# Patient Record
Sex: MALE | Race: WHITE | NOT HISPANIC OR LATINO
[De-identification: names, ages, dates, MRNs, and addresses within clinical notes are randomized per-mention and may not be internally consistent; named-entity substitution may affect disease eponyms.]

---

## 2022-05-18 ENCOUNTER — APPOINTMENT (OUTPATIENT)
Dept: ORTHOPEDIC SURGERY | Facility: CLINIC | Age: 72
End: 2022-05-18
Payer: COMMERCIAL

## 2022-05-18 VITALS — WEIGHT: 208 LBS | HEIGHT: 66 IN | BODY MASS INDEX: 33.43 KG/M2

## 2022-05-18 DIAGNOSIS — Z78.9 OTHER SPECIFIED HEALTH STATUS: ICD-10-CM

## 2022-05-18 DIAGNOSIS — I10 ESSENTIAL (PRIMARY) HYPERTENSION: ICD-10-CM

## 2022-05-18 DIAGNOSIS — E78.00 PURE HYPERCHOLESTEROLEMIA, UNSPECIFIED: ICD-10-CM

## 2022-05-18 DIAGNOSIS — M54.50 LOW BACK PAIN, UNSPECIFIED: ICD-10-CM

## 2022-05-18 PROBLEM — Z00.00 ENCOUNTER FOR PREVENTIVE HEALTH EXAMINATION: Status: ACTIVE | Noted: 2022-05-18

## 2022-05-18 PROCEDURE — 99203 OFFICE O/P NEW LOW 30 MIN: CPT

## 2022-05-18 PROCEDURE — 99204 OFFICE O/P NEW MOD 45 MIN: CPT

## 2022-05-19 PROBLEM — M54.50 LUMBAR SPINE PAIN: Status: ACTIVE | Noted: 2022-05-18

## 2022-05-19 PROBLEM — Z78.9 NON-SMOKER: Status: ACTIVE | Noted: 2022-05-18

## 2022-05-19 PROBLEM — Z78.9 SOCIAL ALCOHOL USE: Status: ACTIVE | Noted: 2022-05-18

## 2022-05-19 NOTE — REVIEW OF SYSTEMS
[Joint Pain] : joint pain [Joint Stiffness] : joint stiffness [Negative] : Heme/Lymph [FreeTextEntry9] : lumbar spine

## 2022-05-19 NOTE — DISCUSSION/SUMMARY
[Medication Risks Reviewed] : Medication risks reviewed [Surgical risks reviewed] : Surgical risks reviewed [de-identified] : Reviewed and discussed results of lumbar spine MRI scan. Discussed proper body mechanics and modified physical activity to avoid aggravation of symptoms. Discussed at length underlying pathology of spondylolisthesis, stenosis with associated symptoms of back and buttock pain. Discussed conservative treatment options in the form of NSAIDs, physical therapy, and injection therapy. Discussed at length surgical intervention in the form of laminectomy and fusion(TLIF L4-5). Discussed risks and benefits. Advised patient to exhaust all conservative treatment options before pursuing surgical intervention. Addressed all questions and concerns to patient's satisfaction. Explained medical clearance process for surgery. Discussed further conservative treatment in the form of medial branch block and radio frequency ablation. Patient will call if he wants to move forward with surgical intervention.

## 2022-05-19 NOTE — HISTORY OF PRESENT ILLNESS
[Gradual] : gradual [5] : 5 [Localized] : localized [Constant] : constant [Household chores] : household chores [Leisure] : leisure [Work] : work [Sleep] : sleep [Nothing helps with pain getting better] : Nothing helps with pain getting better [Sitting] : sitting [Standing] : standing [Walking] : walking [Bending forward] : bending forward [Extending back] : extending back [Lying in bed] : lying in bed [Full time] : Work status: full time [de-identified] : Patient presents for initial encounter with RT sided lower back pain. Patient states he has history of lower back pain that has recently gotten worse. Patient has had history of hip surgeries. Patient states his pain episodically radiates into lower extremity. No numbness/tingling sensation. No changes in lower extremity strength b/l. Patient has been progressively symptomatic approaching  year duration and found no relief with physical therapy and  has continued with at home exercises/stretches as best tolerated. Treatment with epidural injections provided transient relief. Treatment with OTC NSAIDs have not provided satisfactory relief. Patient is slowly becoming more functionally incapacitated , especially with attempts at extended standing which is required for him at work as a . [] : no [FreeTextEntry4] : few years ago [FreeTextEntry5] : onset, no known injury [FreeTextEntry6] : right side upper buttocks area

## 2022-06-13 ENCOUNTER — RESULT REVIEW (OUTPATIENT)
Age: 72
End: 2022-06-13

## 2022-06-15 ENCOUNTER — APPOINTMENT (OUTPATIENT)
Dept: ORTHOPEDIC SURGERY | Facility: CLINIC | Age: 72
End: 2022-06-15

## 2022-06-15 VITALS — WEIGHT: 208 LBS | HEIGHT: 66 IN | BODY MASS INDEX: 33.43 KG/M2

## 2022-06-15 PROCEDURE — 99213 OFFICE O/P EST LOW 20 MIN: CPT

## 2022-06-15 NOTE — HISTORY OF PRESENT ILLNESS
[de-identified] : /Patient returns to the office for pre-operative visit. He is scheduled for L4-5 PLIF on 6/27/22.\par \par Since the last office visit His symptoms have remained unchanged.  No new complaints.\par Preoperative laboratory testing is pending for tomorrow. Medical and cardiac clearances have not yet been obtained\par \par \par

## 2022-06-15 NOTE — DISCUSSION/SUMMARY
[Surgical risks reviewed] : Surgical risks reviewed [de-identified] : Together in the office we reviewed the current diagnosis and its contributions to His symptoms.  The planned surgical procedure was reviewed and explained to His satisfaction including the risks and benefits.  This risk benefit conversation included, but was not limited to infection, bleeding, neurological injury, CSF leak, need for dural repair, hardware male position, pseudoarthrosis, need for additional operation in the future, risks of general anesthesia. Expected outcomes included reduction in pain, improvement in function and expected recovery timeframe. All questions were answered to their satisfaction.\par \par Patient was reminded to bring their diagnostic imaging to the OR on the date of surgery.\par \par he will need to obtain both medical and cardiac clearances (hx of stent x 3).\par \par \par \par \par

## 2022-06-20 RX ORDER — HYDROCODONE BITARTRATE AND ACETAMINOPHEN 10; 325 MG/1; MG/1
10-325 TABLET ORAL
Qty: 60 | Refills: 0 | Status: ACTIVE | COMMUNITY
Start: 2022-06-20 | End: 1900-01-01

## 2022-06-21 ENCOUNTER — NON-APPOINTMENT (OUTPATIENT)
Age: 72
End: 2022-06-21

## 2022-06-22 ENCOUNTER — APPOINTMENT (OUTPATIENT)
Dept: ORTHOPEDIC SURGERY | Facility: CLINIC | Age: 72
End: 2022-06-22
Payer: COMMERCIAL

## 2022-06-22 VITALS — BODY MASS INDEX: 33.43 KG/M2 | HEIGHT: 66 IN | WEIGHT: 208 LBS

## 2022-06-22 PROCEDURE — 72110 X-RAY EXAM L-2 SPINE 4/>VWS: CPT

## 2022-06-22 PROCEDURE — 99214 OFFICE O/P EST MOD 30 MIN: CPT

## 2022-06-22 PROCEDURE — 99212 OFFICE O/P EST SF 10 MIN: CPT

## 2022-06-27 ENCOUNTER — APPOINTMENT (OUTPATIENT)
Dept: ORTHOPEDIC SURGERY | Facility: HOSPITAL | Age: 72
End: 2022-06-27

## 2022-06-27 PROCEDURE — 22633 ARTHRD CMBN 1NTRSPC LUMBAR: CPT | Mod: AS

## 2022-06-27 PROCEDURE — 22853 INSJ BIOMECHANICAL DEVICE: CPT

## 2022-06-27 PROCEDURE — 22853 INSJ BIOMECHANICAL DEVICE: CPT | Mod: AS

## 2022-06-27 PROCEDURE — 63052 LAM FACETC/FRMT ARTHRD LUM 1: CPT

## 2022-06-27 PROCEDURE — 63052 LAM FACETC/FRMT ARTHRD LUM 1: CPT | Mod: AS

## 2022-06-27 PROCEDURE — 22840 INSERT SPINE FIXATION DEVICE: CPT

## 2022-06-27 PROCEDURE — 20936 SP BONE AGRFT LOCAL ADD-ON: CPT

## 2022-06-27 PROCEDURE — 22633 ARTHRD CMBN 1NTRSPC LUMBAR: CPT

## 2022-06-27 PROCEDURE — 22840 INSERT SPINE FIXATION DEVICE: CPT | Mod: AS

## 2022-06-27 PROCEDURE — 20936 SP BONE AGRFT LOCAL ADD-ON: CPT | Mod: AS

## 2022-06-27 NOTE — DISCUSSION/SUMMARY
[Surgical risks reviewed] : Surgical risks reviewed [de-identified] : Together in the office we reviewed the current diagnosis and its contributions to his symptoms. The planned surgical procedure was reviewed and explained to his satisfaction including the risks and benefits.  This risk benefit conversation included, but was not limited to infection, bleeding, neurological injury, CSF leak, need for dural repair, hardware mal position, pseudoarthrosis, need for additional operation in the future, risks of general anesthesia. Expected outcomes included reduction in pain, improvement in function and expected recovery timeframe. All questions were answered to their satisfaction. Repeat XRS today show spondyolisthesis at L4-5. Patient will need to obtain both medical and cardiac clearances (hx of stent x 3). Will continue narcotic and muscle relaxer judiciously as prescribed.\par \par \par \par \par \par \par \par

## 2022-06-27 NOTE — PHYSICAL EXAM
[Extension] : extension [Normal Coordination] : normal coordination [Normal DTR UE/LE] : normal DTR UE/LE  [Normal Sensation] : normal sensation [Normal Mood and Affect] : normal mood and affect [Orientated] : orientated [Able to Communicate] : able to communicate [Normal Skin] : normal skin [No Rash] : no rash [No Ulcers] : no ulcers [No Lesions] : no lesions [No obvious lymphadenopathy in areas examined] : no obvious lymphadenopathy in areas examined [Well Developed] : well developed [Well Nourished] : well nourished [Peripheral vascular exam is grossly normal] : peripheral vascular exam is grossly normal [No Respiratory Distress] : no respiratory distress [] : non-antalgic

## 2022-06-27 NOTE — HISTORY OF PRESENT ILLNESS
[de-identified] : Patient returns to the office for pre-operative visit. He is scheduled for L4-5 PLIF on 6/27/22.Since the last office visit His symptoms have remained unchanged. He is currently experience start up pain. Was working on and around his boat last week and had exacerbation of more left sided low back pain where previously he had more right sided pain.\par \par \par

## 2022-07-05 ENCOUNTER — FORM ENCOUNTER (OUTPATIENT)
Age: 72
End: 2022-07-05

## 2022-07-13 ENCOUNTER — APPOINTMENT (OUTPATIENT)
Dept: ORTHOPEDIC SURGERY | Facility: CLINIC | Age: 72
End: 2022-07-13

## 2022-07-13 VITALS — HEIGHT: 66 IN | WEIGHT: 208 LBS | BODY MASS INDEX: 33.43 KG/M2

## 2022-07-13 PROCEDURE — 72100 X-RAY EXAM L-S SPINE 2/3 VWS: CPT

## 2022-07-13 PROCEDURE — 99024 POSTOP FOLLOW-UP VISIT: CPT

## 2022-07-17 NOTE — HISTORY OF PRESENT ILLNESS
[de-identified] : Patient returns to the office for first postoperative visit. He continues to use some narcotic pain medication approximately two tablets a day. He reports that his left leg symptoms are improving as compared to one week ago. There is less pain from the lower back radiating down the leg. He is ambulating with crutches.\par \par He reports there has been some urinary urgency and small amount of leakage of urine. He denies any saddle anesthesia or significant back pain. He's also being somewhat constipated and he's taking mag citrate. He denies fever chills.\par \par \par \par \par \par \par \par \par \par

## 2022-07-17 NOTE — PHYSICAL EXAM
[Outside films reviewed] : Outside films reviewed [de-identified] : Constitutional:\par - General Appearance:\par Unremarkable\par Body Habitus\par Well Developed\par Well Nourished\par Body Habitus\par No Deformities\par Well Groomed\par Ability To communicate:\par Normal\par Neurologic:\par Global sensation is intact to upper and lower extremities. See examination of Neck and/or Spine\par for exceptions.\par Orientation to Time, Place and Person is: Normal\par Mood And Affect is Normal\par Skin:\par - Head/Face, Right Upper/Lower Extremity, Left Upper/Lower Extremity: Normal\par See Examination of Neck and/or Spine for exceptions\par Cardiovascular:\par Peripheral Cardiovascular System is Normal\par Palpation of Lymph Nodes:\par Normal Palpation of lymph nodes in: Axilla, Cervical, Inguinal\par Abnormal Palpation of lymph nodes in: None \par \par Slight edema in the bilateral distal lower extremities. No sign of DVT [] : no drainage [FreeTextEntry3] : Incision healing nicely. 50% sutures removed. The balance will remain another week. [FreeTextEntry1] : Maintenance of alignment of the hardware in the lumbar spine without movement as compared to postoperative imaging.

## 2022-07-17 NOTE — DISCUSSION/SUMMARY
[de-identified] : Patient will continue with postoperative restrictions. He can ambulate as tolerated with the crutches. We will renew his medications next week if needed. He will follow up with Urology. I suspect he may have some urethral irritation, possibly from the Wan catheter. \par \par

## 2022-07-20 ENCOUNTER — APPOINTMENT (OUTPATIENT)
Dept: ORTHOPEDIC SURGERY | Facility: CLINIC | Age: 72
End: 2022-07-20

## 2022-07-20 VITALS — HEIGHT: 66 IN | BODY MASS INDEX: 33.43 KG/M2 | WEIGHT: 208 LBS

## 2022-07-20 DIAGNOSIS — M48.061 SPINAL STENOSIS, LUMBAR REGION WITHOUT NEUROGENIC CLAUDICATION: ICD-10-CM

## 2022-07-20 PROCEDURE — 99024 POSTOP FOLLOW-UP VISIT: CPT

## 2022-07-20 PROCEDURE — 72100 X-RAY EXAM L-S SPINE 2/3 VWS: CPT

## 2022-07-25 NOTE — HISTORY OF PRESENT ILLNESS
[de-identified] : Patient returns to the office for second postoperative visit. He continues to use some narcotic pain medication approximately two tablets a day. He reports that his left leg symptoms are worse compared to one week ago. Attributes some of the heightened severity to running out of pain medication.There is still pain from the lower back radiating down the leg. Pain worse with prolonged sitting/ standing. Reports difficulty with walking. He is ambulating with crutches. Slight improvement with micturition. Reports extreme difficulty sleeping due to severity of pain. \par \par \par \par \par \par \par \par \par \par

## 2022-07-25 NOTE — DISCUSSION/SUMMARY
[Medication Risks Reviewed] : Medication risks reviewed [de-identified] : Patient will continue with postoperative restrictions. He can ambulate as tolerated with the crutches. Renewed oxycodone. Discussed medication management and risks associated with analgesic drug use. Discussed and reviewed results of x-ray. Patient was provided with a referral for  physical therapy to work on stretching, strengthening of lower extremities, gait training. Discussed proper wound care and management. Will follow up in 2 weeks. \par \par Prior to appointment and during encounter with patient extensive medical records were reviewed including but not limited to, hospital records, outpatient records, imaging results, and lab data.During this appointment the patient was examined, diagnoses were discussed and explained in a face to face manner. In addition extensive time was spent reviewing aforementioned diagnostic studies. Counseling including abnormal image results, differential diagnoses, treatment options, risk and benefits, lifestyle changes, current condition, and current medications was performed. Patient's comments, questions, and concerns were addressed and patient verbalized understanding. Based on this patient's presentation at our office, which is an orthopedic spine surgeon's office, this patient inherently / intrinsically has a risk, however minute, of developing issues such as Cauda equina syndrome, bowel and bladder changes, or progression of motor or neurological deficits such as paralysis which may be permanent.\par \par SELMA CABRERA Acting as a Scribe for Dr. Berenice WILSON, Selma Cabrera, attest that this documentation has been prepared under the direction and in the presence of Provider Aayush Jarrett MD. \par \par

## 2022-07-25 NOTE — DATA REVIEWED
[Lumbar Spine] : lumbar spine [I independently reviewed and interpreted images and report] : I independently reviewed and interpreted images and report [I reviewed the films/CD and additionally noted] : I reviewed the films/CD and additionally noted [FreeTextEntry1] : In office xrays taken today demonstrate no change in alignment or implant placement

## 2022-07-25 NOTE — PHYSICAL EXAM
[Implants in position] : Implants in position [No loosening of hardware] : No loosening of hardware [Fusion intact] : Fusion intact [de-identified] : Constitutional:\par - General Appearance:\par Unremarkable\par Body Habitus\par Well Developed\par Well Nourished\par Body Habitus\par No Deformities\par Well Groomed\par Ability To communicate:\par Normal\par Neurologic:\par Global sensation is intact to upper and lower extremities. See examination of Neck and/or Spine\par for exceptions.\par Orientation to Time, Place and Person is: Normal\par Mood And Affect is Normal\par Skin:\par - Head/Face, Right Upper/Lower Extremity, Left Upper/Lower Extremity: Normal\par See Examination of Neck and/or Spine for exceptions\par Cardiovascular:\par Peripheral Cardiovascular System is Normal\par Palpation of Lymph Nodes:\par Normal Palpation of lymph nodes in: Axilla, Cervical, Inguinal\par Abnormal Palpation of lymph nodes in: None \par \par Slight edema in the bilateral distal lower extremities. No sign of DVT [] : no sign of infection

## 2022-08-03 ENCOUNTER — APPOINTMENT (OUTPATIENT)
Dept: ORTHOPEDIC SURGERY | Facility: CLINIC | Age: 72
End: 2022-08-03

## 2022-08-03 VITALS — WEIGHT: 208 LBS | HEIGHT: 66 IN | BODY MASS INDEX: 33.43 KG/M2

## 2022-08-03 PROCEDURE — 99024 POSTOP FOLLOW-UP VISIT: CPT

## 2022-08-14 NOTE — HISTORY OF PRESENT ILLNESS
[de-identified] : Patient returns to the office for third postoperative visit. C/o low back, Rt buttock, and b/l calf pain. States PT is helpful, but his pain is still persistent. He continues to use some narcotic pain medication approximately two tablets a day. He reports that his left leg symptoms are better compared to his last visit, but still uses crutches for ambulation. There is still pain from the lower back radiating down the leg. Pain worse with prolonged sitting/ standing. Reports difficulty with walking. Slight improvement with micturition. Reports extreme difficulty sleeping due to severity of pain. \par \par \par \par \par \par \par \par \par \par \par \par  [de-identified] : PT ,

## 2022-08-14 NOTE — PHYSICAL EXAM
[Implants in position] : Implants in position [No loosening of hardware] : No loosening of hardware [Fusion intact] : Fusion intact [de-identified] : Constitutional:\par - General Appearance:\par Unremarkable\par Body Habitus\par Well Developed\par Well Nourished\par Body Habitus\par No Deformities\par Well Groomed\par Ability To communicate:\par Normal\par Neurologic:\par Global sensation is intact to upper and lower extremities. See examination of Neck and/or Spine\par for exceptions.\par Orientation to Time, Place and Person is: Normal\par Mood And Affect is Normal\par Skin:\par - Head/Face, Right Upper/Lower Extremity, Left Upper/Lower Extremity: Normal\par See Examination of Neck and/or Spine for exceptions\par Cardiovascular:\par Peripheral Cardiovascular System is Normal\par Palpation of Lymph Nodes:\par Normal Palpation of lymph nodes in: Axilla, Cervical, Inguinal\par Abnormal Palpation of lymph nodes in: None \par \par Slight edema in the bilateral distal lower extremities. No sign of DVT [] : no sign of infection [de-identified] : RT buttock tenderness.

## 2022-08-14 NOTE — DISCUSSION/SUMMARY
[Medication Risks Reviewed] : Medication risks reviewed [de-identified] : Patient will continue with postoperative restrictions. He can ambulate as tolerated with the crutches and progress to cane  or no assistive device. . Patient will continue with methocarbamol and Lyrica as prescribed. Patient will call for oxycodone renewal. Discussed medication management and risks associated with analgesic drug use. Patient will continue with physical therapy to work on stretching, strengthening of lower extremities, gait training. Concentrate therapy with goal of strengthening the lower extremities with the goal of eliminating use of crutches. Renewal for PT given.  Discussed proper wound care and management. Will follow up in 3 weeks for repeat x-ray. \par \par Prior to appointment and during encounter with patient extensive medical records were reviewed including but not limited to, hospital records, outpatient records, imaging results, and lab data.During this appointment the patient was examined, diagnoses were discussed and explained in a face to face manner. In addition extensive time was spent reviewing aforementioned diagnostic studies. Counseling including abnormal image results, differential diagnoses, treatment options, risk and benefits, lifestyle changes, current condition, and current medications was performed. Patient's comments, questions, and concerns were addressed and patient verbalized understanding. Based on this patient's presentation at our office, which is an orthopedic spine surgeon's office, this patient inherently / intrinsically has a risk, however minute, of developing issues such as Cauda equina syndrome, bowel and bladder changes, or progression of motor or neurological deficits such as paralysis which may be permanent.\par \par SELMA CABRERA Acting as a Scribe for Dr. Berenice WILSON, Selma Cabrera, attest that this documentation has been prepared under the direction and in the presence of Provider Aayush Jarrett MD. \par \par

## 2022-08-24 ENCOUNTER — APPOINTMENT (OUTPATIENT)
Dept: ORTHOPEDIC SURGERY | Facility: CLINIC | Age: 72
End: 2022-08-24

## 2022-08-24 VITALS — BODY MASS INDEX: 33.43 KG/M2 | WEIGHT: 208 LBS | HEIGHT: 66 IN

## 2022-08-24 PROCEDURE — 99024 POSTOP FOLLOW-UP VISIT: CPT

## 2022-08-24 PROCEDURE — 72100 X-RAY EXAM L-S SPINE 2/3 VWS: CPT

## 2022-08-24 NOTE — HISTORY OF PRESENT ILLNESS
[de-identified] : Patient returns to the office for fourth postoperative visit. \par Reports his symptoms have been improving since his last visit. Patient has been attending PT 2 x week. C/o low back, Rt buttock, and b/l calf pain. Leg pain > leg pain. He continues to use some narcotic pain medication approximately two tablets a day. He reports that his left leg symptoms are better compared to his last visit, but still uses crutches for ambulation. There is still pain from the lower back radiating down the leg, but has improved. Pain worse with prolonged sitting/ standing. Reports difficulty with walking. Slight improvement with micturition. Reports extreme difficulty sleeping due to severity of pain. \par \par \par \par \par \par \par \par \par \par \par \par  [de-identified] : PT ,

## 2022-08-24 NOTE — DISCUSSION/SUMMARY
[Medication Risks Reviewed] : Medication risks reviewed [de-identified] : Discussed and reviewed results of lumbar spine x-ray. Patient will continue with postoperative restrictions. He can ambulate as tolerated with the crutches and progress to cane  or no assistive device.  Patient will continue with methocarbamol and Lyrica as prescribed. Patient will call for oxycodone renewal. Discussed medication management and risks associated with analgesic drug use. Patient will continue with physical therapy to work on stretching, strengthening of lower extremities, gait training. Begin introducing HEP. Concentrate therapy with goal of strengthening the lower extremities with the goal of eliminating use of crutches. Renewal for PT given.  Discussed proper wound care and management. Will follow up in 3-4 weeks. \par \par Prior to appointment and during encounter with patient extensive medical records were reviewed including but not limited to, hospital records, outpatient records, imaging results, and lab data.During this appointment the patient was examined, diagnoses were discussed and explained in a face to face manner. In addition extensive time was spent reviewing aforementioned diagnostic studies. Counseling including abnormal image results, differential diagnoses, treatment options, risk and benefits, lifestyle changes, current condition, and current medications was performed. Patient's comments, questions, and concerns were addressed and patient verbalized understanding. Based on this patient's presentation at our office, which is an orthopedic spine surgeon's office, this patient inherently / intrinsically has a risk, however minute, of developing issues such as Cauda equina syndrome, bowel and bladder changes, or progression of motor or neurological deficits such as paralysis which may be permanent.\par \par SELMA CABRERA Acting as a Scribe for Dr. Berenice WILSON, Selma Cabrera, attest that this documentation has been prepared under the direction and in the presence of Provider Aayush Jarrett MD. \par \par

## 2022-08-24 NOTE — PHYSICAL EXAM
[Implants in position] : Implants in position [No loosening of hardware] : No loosening of hardware [Fusion intact] : Fusion intact [Scoliosis] : Scoliosis [de-identified] : Constitutional:\par - General Appearance:\par Unremarkable\par Body Habitus\par Well Developed\par Well Nourished\par Body Habitus\par No Deformities\par Well Groomed\par Ability To communicate:\par Normal\par Neurologic:\par Global sensation is intact to upper and lower extremities. See examination of Neck and/or Spine\par for exceptions.\par Orientation to Time, Place and Person is: Normal\par Mood And Affect is Normal\par Skin:\par - Head/Face, Right Upper/Lower Extremity, Left Upper/Lower Extremity: Normal\par See Examination of Neck and/or Spine for exceptions\par Cardiovascular:\par Peripheral Cardiovascular System is Normal\par Palpation of Lymph Nodes:\par Normal Palpation of lymph nodes in: Axilla, Cervical, Inguinal\par Abnormal Palpation of lymph nodes in: None \par \par Slight edema in the bilateral distal lower extremities. No sign of DVT [] : no sign of infection [de-identified] : LLE weaker than RLE [de-identified] : RT buttock tenderness.

## 2022-08-25 ENCOUNTER — EMERGENCY (EMERGENCY)
Facility: HOSPITAL | Age: 72
LOS: 1 days | Discharge: HOME | End: 2022-08-25
Attending: EMERGENCY MEDICINE | Admitting: EMERGENCY MEDICINE
Payer: COMMERCIAL

## 2022-08-25 VITALS
WEIGHT: 210.1 LBS | TEMPERATURE: 97 F | DIASTOLIC BLOOD PRESSURE: 79 MMHG | SYSTOLIC BLOOD PRESSURE: 149 MMHG | HEIGHT: 66 IN | RESPIRATION RATE: 18 BRPM | HEART RATE: 54 BPM | OXYGEN SATURATION: 99 %

## 2022-08-25 VITALS
OXYGEN SATURATION: 99 % | SYSTOLIC BLOOD PRESSURE: 133 MMHG | TEMPERATURE: 97 F | RESPIRATION RATE: 16 BRPM | HEART RATE: 58 BPM | DIASTOLIC BLOOD PRESSURE: 75 MMHG

## 2022-08-25 DIAGNOSIS — Z87.39 PERSONAL HISTORY OF OTHER DISEASES OF THE MUSCULOSKELETAL SYSTEM AND CONNECTIVE TISSUE: Chronic | ICD-10-CM

## 2022-08-25 DIAGNOSIS — Z98.1 ARTHRODESIS STATUS: Chronic | ICD-10-CM

## 2022-08-25 PROCEDURE — 72100 X-RAY EXAM L-S SPINE 2/3 VWS: CPT | Mod: 26

## 2022-08-25 PROCEDURE — 99283 EMERGENCY DEPT VISIT LOW MDM: CPT | Mod: 25

## 2022-08-25 PROCEDURE — 99284 EMERGENCY DEPT VISIT MOD MDM: CPT

## 2022-08-25 PROCEDURE — 72100 X-RAY EXAM L-S SPINE 2/3 VWS: CPT
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
## 2022-08-25 NOTE — ED PROVIDER NOTE - NS ED ATTENDING STATEMENT MOD
This was a shared visit with the [facility]. I reviewed and verified the documentation and independently performed the documented:
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
## 2022-08-25 NOTE — ED PROVIDER NOTE - PATIENT PORTAL LINK FT
You can access the FollowMyHealth Patient Portal offered by [facility] by registering at the following website: [email or web address]. By joining [employer]’s FollowMyHealth portal, you will also be able to view your health information using other applications (apps) compatible with our system.

## 2022-08-25 NOTE — ED PROVIDER NOTE - CARE PROVIDER_API CALL
Aayush Jarrett)  Orthopaedic Surgery  [address]  [place] [address]  Phone: [phone]  Fax: [phone]  Follow Up Time:
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
## 2022-08-25 NOTE — ED PROVIDER NOTE - MUSCULOSKELETAL, MLM
Neck supple nontender FROM. Back no midline or paraspinal tenderness; lumbar healed surgical site noted. FROM x 4. No foot drop.

## 2022-09-21 ENCOUNTER — APPOINTMENT (OUTPATIENT)
Dept: ORTHOPEDIC SURGERY | Facility: CLINIC | Age: 72
End: 2022-09-21

## 2022-09-21 VITALS — WEIGHT: 208 LBS | HEIGHT: 66 IN | BODY MASS INDEX: 33.43 KG/M2

## 2022-09-21 PROBLEM — I10 ESSENTIAL (PRIMARY) HYPERTENSION: Chronic | Status: ACTIVE | Noted: 2022-08-25

## 2022-09-21 PROBLEM — I25.10 ATHEROSCLEROTIC HEART DISEASE OF NATIVE CORONARY ARTERY WITHOUT ANGINA PECTORIS: Chronic | Status: ACTIVE | Noted: 2022-08-25

## 2022-09-21 PROBLEM — E78.5 HYPERLIPIDEMIA, UNSPECIFIED: Chronic | Status: ACTIVE | Noted: 2022-08-25

## 2022-09-21 PROCEDURE — 99024 POSTOP FOLLOW-UP VISIT: CPT

## 2022-09-22 NOTE — HISTORY OF PRESENT ILLNESS
[de-identified] : 73 y/o male presents for a fifth post operative visit. Left lower extremity weakness - he is slowly building his strength daily. He has been attending PT 2x week - states he is slowly making progress. He is slowly weening off ambulating with crutch, he is using one in office today. States the crutch is due to his leg pain vs the weakness. He is taking oxycodone every 6 hours - he is working on weening off to every 8 hours. Reports extreme pain when trying to ween off. Pain more severe at nighttime. He has not been taking methocarbamol at nighttime. Compared to a month ago - he reports his symptoms are about the same, slight improvement. C/o low back pain, he still has buttock pain. With respect to walking long distances - he feels he is at a plateau. \par \par \par \par \par \par \par \par \par \par \par  [de-identified] : PT ,

## 2022-09-22 NOTE — DISCUSSION/SUMMARY
[Medication Risks Reviewed] : Medication risks reviewed [de-identified] : He can ambulate as tolerated with the crutches and progress to cane  or no assistive device.  Patient will continue with methocarbamol, Lyrica, and oxycodone as prescribed - renewed Prescriptions. Discussed weening off Lyrica for 4 weeks, advised patient to monitor his symptom response. Patient will continue weening off narcotic. Discussed medication management and risks associated with analgesic drug use. Patient will continue with physical therapy to work on stretching, strengthening of lower extremities, gait training. Concentrate therapy with goal of strengthening the lower extremities with the goal of eliminating use of crutches. Renewal for PT given - he will have dialogue with therapist with respect to transitioning to home program. Patient will receive CT scan to eval for crack of the pedicle, r/o possible implant loosening as a source of persistent back pain. - Referral given. Follow up after CT scan.\par \par Prior to appointment and during encounter with patient extensive medical records were reviewed including but not limited to, hospital records, outpatient records, imaging results, and lab data.During this appointment the patient was examined, diagnoses were discussed and explained in a face to face manner. In addition extensive time was spent reviewing aforementioned diagnostic studies. Counseling including abnormal image results, differential diagnoses, treatment options, risk and benefits, lifestyle changes, current condition, and current medications was performed. Patient's comments, questions, and concerns were addressed and patient verbalized understanding. Based on this patient's presentation at our office, which is an orthopedic spine surgeon's office, this patient inherently / intrinsically has a risk, however minute, of developing issues such as Cauda equina syndrome, bowel and bladder changes, or progression of motor or neurological deficits such as paralysis which may be permanent.\par \stan TAPIA Acting as a Scribe for [address], attest that this documentation has been prepared under the direction and in the presence of Provider Aayush Jarrett MD. \par \par

## 2022-09-22 NOTE — PHYSICAL EXAM
[de-identified] : Constitutional:\par - General Appearance:\par Unremarkable\par Body Habitus\par Well Developed\par Well Nourished\par Body Habitus\par No Deformities\par Well Groomed\par Ability To communicate:\par Normal\par Neurologic:\par Global sensation is intact to upper and lower extremities. See examination of Neck and/or Spine\par for exceptions.\par Orientation to Time, Place and Person is: Normal\par Mood And Affect is Normal\par Skin:\par - Head/Face, Right Upper/Lower Extremity, Left Upper/Lower Extremity: Normal\par See Examination of Neck and/or Spine for exceptions\par Cardiovascular:\par Peripheral Cardiovascular System is Normal\par Palpation of Lymph Nodes:\par Normal Palpation of lymph nodes in: Axilla, Cervical, Inguinal\par Abnormal Palpation of lymph nodes in: None \par \par Slight edema in the bilateral distal lower extremities. No sign of DVT [] : no sign of infection [FreeTextEntry9] : Pain in lower extremities with forward flexion.  [de-identified] : LLE weaker than RLE

## 2022-10-17 ENCOUNTER — RESULT REVIEW (OUTPATIENT)
Age: 72
End: 2022-10-17

## 2022-10-21 ENCOUNTER — APPOINTMENT (OUTPATIENT)
Dept: ORTHOPEDIC SURGERY | Facility: CLINIC | Age: 72
End: 2022-10-21
Payer: COMMERCIAL

## 2022-10-21 VITALS — HEIGHT: 66 IN | WEIGHT: 208 LBS | BODY MASS INDEX: 33.43 KG/M2

## 2022-10-21 PROCEDURE — L0642: CPT

## 2022-10-21 PROCEDURE — 99213 OFFICE O/P EST LOW 20 MIN: CPT

## 2022-10-21 PROCEDURE — 99214 OFFICE O/P EST MOD 30 MIN: CPT

## 2022-10-22 NOTE — DISCUSSION/SUMMARY
[Medication Risks Reviewed] : Medication risks reviewed [de-identified] : 71 y/o male is approximately 4 months post op. Patient will continue weening off narcotic. Discussed medication management and risks associated with analgesic drug use. Discussed and reviewed results of CT scan, patient appears to have progress toward fusion but implant loosening is present. We will continue to monitor patients healing and response to conservative treatment before proceeding with surgical revision. Patient will continue to work on stretching, strengthening of lower extremities, gait training. Concentrate goal of strengthening the lower extremities. Advised patient use LSO at heightened times of physical demand - brace given in office. Discussed revision spine surgery if symptoms worsen.Will reevaluate patients symptoms in 2 months. \par \par Prior to appointment and during encounter with patient extensive medical records were reviewed including but not limited to, hospital records, outpatient records, imaging results, and lab data.During this appointment the patient was examined, diagnoses were discussed and explained in a face to face manner. In addition extensive time was spent reviewing aforementioned diagnostic studies. Counseling including abnormal image results, differential diagnoses, treatment options, risk and benefits, lifestyle changes, current condition, and current medications was performed. Patient's comments, questions, and concerns were addressed and patient verbalized understanding. Based on this patient's presentation at our office, which is an orthopedic spine surgeon's office, this patient inherently / intrinsically has a risk, however minute, of developing issues such as Cauda equina syndrome, bowel and bladder changes, or progression of motor or neurological deficits such as paralysis which may be permanent.\par \par SELMA CABRERA Acting as a Scribe for Dr. Berenice WILSON, [place] [employer], attest that this documentation has been prepared under the direction and in the presence of Provider Aayush Jarrett MD. \par \par

## 2022-10-22 NOTE — PHYSICAL EXAM
[Normal Coordination] : normal coordination [Normal DTR UE/LE] : normal DTR UE/LE  [Normal Sensation] : normal sensation [Normal Mood and Affect] : normal mood and affect [Orientated] : orientated [Able to Communicate] : able to communicate [Normal Skin] : normal skin [No Rash] : no rash [No Ulcers] : no ulcers [No Lesions] : no lesions [No obvious lymphadenopathy in areas examined] : no obvious lymphadenopathy in areas examined [Well Developed] : well developed [Well Nourished] : well nourished [Peripheral vascular exam is grossly normal] : peripheral vascular exam is grossly normal [No Respiratory Distress] : no respiratory distress [de-identified] : Constitutional:\par - General Appearance:\par Unremarkable\par Body Habitus\par Well Developed\par Well Nourished\par Body Habitus\par No Deformities\par Well Groomed\par Ability To communicate:\par Normal\par Neurologic:\par Global sensation is intact to upper and lower extremities. See examination of Neck and/or Spine\par for exceptions.\par Orientation to Time, Place and Person is: Normal\par Mood And Affect is Normal\par Skin:\par - Head/Face, Right Upper/Lower Extremity, Left Upper/Lower Extremity: Normal\par See Examination of Neck and/or Spine for exceptions\par Cardiovascular:\par Peripheral Cardiovascular System is Normal\par Palpation of Lymph Nodes:\par Normal Palpation of lymph nodes in: Axilla, Cervical, Inguinal\par Abnormal Palpation of lymph nodes in: None \par \par Slight edema in the bilateral distal lower extremities. No sign of DVT [] : no sign of infection [FreeTextEntry9] : Pain in lower extremities with forward flexion.  [de-identified] : LLE weaker than RLE

## 2022-10-22 NOTE — DATA REVIEWED
[CT Scan] : CT scan [Lumbar Spine] : lumbar spine [Report was reviewed and noted in the chart] : The report was reviewed and noted in the chart [I independently reviewed and interpreted images and report] : I independently reviewed and interpreted images and report [I reviewed the films/CD and additionally noted] : I reviewed the films/CD and additionally noted [FreeTextEntry1] : I stop paperwork reviewed

## 2022-10-22 NOTE — REASON FOR VISIT
[FreeTextEntry2] : Test follow up, Lumbar spine CT Scan done at [facility]

## 2022-10-22 NOTE — HISTORY OF PRESENT ILLNESS
[de-identified] : 10/21/2022 - 73 y/o male presents for a test follow up of lumbar. Symptoms remain the same since his last visit. Severity of his pain ranges. Moderate pain during the day, significant at night. He is using ibuprofen and oxycodone prn at night to manage his pain. Physical activity is tolerable with prescribed medications, but still problematic. He has continued to ween off oxycodone, taking 2-3 per week. \par \par 09/21/2022 -  73 y/o male presents for a fifth post operative visit. Left lower extremity weakness - he is slowly building his strength daily. He has been attending PT 2x week - states he is slowly making progress. He is slowly weening off ambulating with crutch, he is using one in office today. States the crutch is due to his leg pain vs the weakness. He is taking oxycodone every 6 hours - he is working on weening off to every 8 hours. Reports extreme pain when trying to ween off. Pain more severe at nighttime. He has not been taking methocarbamol at nighttime. Compared to a month ago - he reports his symptoms are about the same, slight improvement. C/o low back pain, he still has buttock pain. With respect to walking long distances - he feels he is at a plateau. \par \par \par \par \par \par \par \par \par \par \par  [de-identified] : PT ,

## 2022-12-09 ENCOUNTER — APPOINTMENT (OUTPATIENT)
Dept: ORTHOPEDIC SURGERY | Facility: CLINIC | Age: 72
End: 2022-12-09

## 2022-12-16 ENCOUNTER — APPOINTMENT (OUTPATIENT)
Dept: ORTHOPEDIC SURGERY | Facility: CLINIC | Age: 72
End: 2022-12-16
Payer: COMMERCIAL

## 2022-12-16 VITALS — WEIGHT: 208 LBS | HEIGHT: 66 IN | BODY MASS INDEX: 33.43 KG/M2

## 2022-12-16 DIAGNOSIS — M43.16 SPONDYLOLISTHESIS, LUMBAR REGION: ICD-10-CM

## 2022-12-16 PROCEDURE — 99214 OFFICE O/P EST MOD 30 MIN: CPT

## 2022-12-16 PROCEDURE — 99213 OFFICE O/P EST LOW 20 MIN: CPT

## 2022-12-29 NOTE — DISCUSSION/SUMMARY
[Medication Risks Reviewed] : Medication risks reviewed [de-identified] : 73 y/o male is approximately 6 months post op. Patient will continue weening off narcotic. Discussed medication management and risks associated with analgesic drug use. We will continue to monitor patients healing and response to conservative treatment before proceeding with surgical revision. However, patient will receive CT scan to evaluate interval fusion compared to last study and implant loosening.  Advised patient use LSO at heightened times of physical demand. Discussed revision spine surgery if symptoms worsen/ based upon results of CT Scan. Will follow up with the patient after study is complete for further treatment plan. \par \par Prior to appointment and during encounter with patient extensive medical records were reviewed including but not limited to, hospital records, outpatient records, imaging results, and lab data.During this appointment the patient was examined, diagnoses were discussed and explained in a face to face manner. In addition extensive time was spent reviewing aforementioned diagnostic studies. Counseling including abnormal image results, differential diagnoses, treatment options, risk and benefits, lifestyle changes, current condition, and current medications was performed. Patient's comments, questions, and concerns were addressed and patient verbalized understanding. Based on this patient's presentation at our office, which is an orthopedic spine surgeon's office, this patient inherently / intrinsically has a risk, however minute, of developing issues such as Cauda equina syndrome, bowel and bladder changes, or progression of motor or neurological deficits such as paralysis which may be permanent.\par \par SELMA TAPIA Acting as a Scribe for [address], attest that this documentation has been prepared under the direction and in the presence of Provider Aayush Jarrett MD. \par \par
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
## 2022-12-29 NOTE — PHYSICAL EXAM
[Normal Coordination] : normal coordination [Normal DTR UE/LE] : normal DTR UE/LE  [Normal Sensation] : normal sensation [Normal Mood and Affect] : normal mood and affect [Orientated] : orientated [Able to Communicate] : able to communicate [Normal Skin] : normal skin [No Rash] : no rash [No Ulcers] : no ulcers [No Lesions] : no lesions [No obvious lymphadenopathy in areas examined] : no obvious lymphadenopathy in areas examined [Well Developed] : well developed [Well Nourished] : well nourished [Peripheral vascular exam is grossly normal] : peripheral vascular exam is grossly normal [No Respiratory Distress] : no respiratory distress [de-identified] : Constitutional:\par - General Appearance:\par Unremarkable\par Body Habitus\par Well Developed\par Well Nourished\par Body Habitus\par No Deformities\par Well Groomed\par Ability To communicate:\par Normal\par Neurologic:\par Global sensation is intact to upper and lower extremities. See examination of Neck and/or Spine\par for exceptions.\par Orientation to Time, Place and Person is: Normal\par Mood And Affect is Normal\par Skin:\par - Head/Face, Right Upper/Lower Extremity, Left Upper/Lower Extremity: Normal\par See Examination of Neck and/or Spine for exceptions\par Cardiovascular:\par Peripheral Cardiovascular System is Normal\par Palpation of Lymph Nodes:\par Normal Palpation of lymph nodes in: Axilla, Cervical, Inguinal\par Abnormal Palpation of lymph nodes in: None \par \par Slight edema in the bilateral distal lower extremities. No sign of DVT [] : clonus not sustained at ankle [FreeTextEntry9] : Pain in lower extremities with forward flexion.  [de-identified] : LLE weaker than RLE

## 2023-02-03 ENCOUNTER — APPOINTMENT (OUTPATIENT)
Dept: ORTHOPEDIC SURGERY | Facility: CLINIC | Age: 73
End: 2023-02-03

## 2023-02-12 ENCOUNTER — FORM ENCOUNTER (OUTPATIENT)
Age: 73
End: 2023-02-12

## 2023-05-09 ENCOUNTER — FORM ENCOUNTER (OUTPATIENT)
Age: 73
End: 2023-05-09

## 2023-05-10 ENCOUNTER — FORM ENCOUNTER (OUTPATIENT)
Age: 73
End: 2023-05-10

## 2023-07-20 ENCOUNTER — APPOINTMENT (OUTPATIENT)
Dept: OTOLARYNGOLOGY | Facility: CLINIC | Age: 73
End: 2023-07-20
Payer: COMMERCIAL

## 2023-07-20 ENCOUNTER — NON-APPOINTMENT (OUTPATIENT)
Age: 73
End: 2023-07-20

## 2023-07-20 VITALS
BODY MASS INDEX: 32.95 KG/M2 | SYSTOLIC BLOOD PRESSURE: 143 MMHG | DIASTOLIC BLOOD PRESSURE: 77 MMHG | WEIGHT: 205 LBS | HEART RATE: 48 BPM | HEIGHT: 66 IN

## 2023-07-20 DIAGNOSIS — H93.293 OTHER ABNORMAL AUDITORY PERCEPTIONS, BILATERAL: ICD-10-CM

## 2023-07-20 DIAGNOSIS — H61.23 IMPACTED CERUMEN, BILATERAL: ICD-10-CM

## 2023-07-20 DIAGNOSIS — H90.3 SENSORINEURAL HEARING LOSS, BILATERAL: ICD-10-CM

## 2023-07-20 PROCEDURE — 99203 OFFICE O/P NEW LOW 30 MIN: CPT | Mod: 25

## 2023-07-20 PROCEDURE — 92557 COMPREHENSIVE HEARING TEST: CPT

## 2023-07-20 PROCEDURE — G0268 REMOVAL OF IMPACTED WAX MD: CPT

## 2023-07-20 PROCEDURE — 92567 TYMPANOMETRY: CPT

## 2023-07-20 RX ORDER — PREGABALIN 75 MG/1
75 CAPSULE ORAL TWICE DAILY
Qty: 60 | Refills: 1 | Status: DISCONTINUED | COMMUNITY
Start: 2022-09-21 | End: 2023-07-20

## 2023-07-20 RX ORDER — METOPROLOL TARTRATE 100 MG/1
100 TABLET, FILM COATED ORAL
Refills: 0 | Status: ACTIVE | COMMUNITY

## 2023-07-20 RX ORDER — METHOCARBAMOL 750 MG/1
750 TABLET, FILM COATED ORAL 4 TIMES DAILY
Qty: 120 | Refills: 1 | Status: DISCONTINUED | COMMUNITY
Start: 2022-09-21 | End: 2023-07-20

## 2023-07-20 RX ORDER — OXYCODONE 5 MG/1
5 TABLET ORAL 4 TIMES DAILY
Qty: 120 | Refills: 0 | Status: DISCONTINUED | COMMUNITY
Start: 2022-09-21 | End: 2023-07-20

## 2023-07-20 RX ORDER — PREGABALIN 75 MG/1
75 CAPSULE ORAL TWICE DAILY
Qty: 60 | Refills: 1 | Status: DISCONTINUED | COMMUNITY
Start: 2022-07-06 | End: 2023-07-20

## 2023-07-20 RX ORDER — METHOCARBAMOL 750 MG/1
750 TABLET, FILM COATED ORAL
Qty: 30 | Refills: 0 | Status: DISCONTINUED | COMMUNITY
Start: 2022-06-20 | End: 2023-07-20

## 2023-07-20 RX ORDER — OXYCODONE 5 MG/1
5 TABLET ORAL 4 TIMES DAILY
Qty: 120 | Refills: 0 | Status: DISCONTINUED | COMMUNITY
Start: 2022-07-20 | End: 2023-07-20

## 2023-07-20 RX ORDER — METHOCARBAMOL 750 MG/1
750 TABLET, FILM COATED ORAL 3 TIMES DAILY
Qty: 90 | Refills: 1 | Status: DISCONTINUED | COMMUNITY
Start: 2022-07-06 | End: 2023-07-20

## 2023-07-20 RX ORDER — OXYCODONE 5 MG/1
5 TABLET ORAL EVERY 6 HOURS
Qty: 60 | Refills: 0 | Status: DISCONTINUED | COMMUNITY
Start: 2022-12-16 | End: 2023-07-20

## 2023-07-20 NOTE — REVIEW OF SYSTEMS
[Hearing Loss] : hearing loss [Problem Snoring] : problem snoring [Negative] : Heme/Lymph [FreeTextEntry1] : daytime sleepiness

## 2023-07-20 NOTE — DATA REVIEWED
[de-identified] : Type As AD\par Type A AS\par AUDIO: normal sloping to severe SNHL, 250-8000Hz, AU\par REC: ENT f/u, re-eval per MD, consider HA eval

## 2023-07-20 NOTE — PHYSICAL EXAM
[Normal] : mucosa is normal [Midline] : trachea located in midline position [de-identified] : CI AU

## 2023-07-20 NOTE — REASON FOR VISIT
[Initial Consultation] : an initial consultation for [FreeTextEntry2] : ear cleaning, decreased hearing
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
## 2023-07-20 NOTE — ASSESSMENT
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

## 2024-03-07 ENCOUNTER — TRANSCRIPTION ENCOUNTER (OUTPATIENT)
Age: 74
End: 2024-03-07

## 2024-09-12 ENCOUNTER — NON-APPOINTMENT (OUTPATIENT)
Age: 74
End: 2024-09-12

## 2024-12-18 ENCOUNTER — APPOINTMENT (OUTPATIENT)
Dept: OTOLARYNGOLOGY | Facility: CLINIC | Age: 74
End: 2024-12-18
Payer: COMMERCIAL

## 2024-12-18 VITALS
SYSTOLIC BLOOD PRESSURE: 133 MMHG | WEIGHT: 210 LBS | BODY MASS INDEX: 33.75 KG/M2 | HEART RATE: 50 BPM | HEIGHT: 66 IN | DIASTOLIC BLOOD PRESSURE: 74 MMHG

## 2024-12-18 DIAGNOSIS — H90.3 SENSORINEURAL HEARING LOSS, BILATERAL: ICD-10-CM

## 2024-12-18 DIAGNOSIS — H61.23 IMPACTED CERUMEN, BILATERAL: ICD-10-CM

## 2024-12-18 PROCEDURE — 99213 OFFICE O/P EST LOW 20 MIN: CPT

## 2024-12-18 RX ORDER — AMLODIPINE BESYLATE 5 MG/1
TABLET ORAL
Refills: 0 | Status: ACTIVE | COMMUNITY

## 2024-12-18 RX ORDER — ALLOPURINOL 200 MG/1
TABLET ORAL
Refills: 0 | Status: ACTIVE | COMMUNITY

## 2024-12-18 RX ORDER — LOSARTAN POTASSIUM 100 MG/1
TABLET, FILM COATED ORAL
Refills: 0 | Status: ACTIVE | COMMUNITY

## 2024-12-18 RX ORDER — EZETIMIBE 10 MG/1
TABLET ORAL
Refills: 0 | Status: ACTIVE | COMMUNITY

## 2024-12-18 RX ORDER — ROSUVASTATIN CALCIUM 5 MG/1
TABLET, FILM COATED ORAL
Refills: 0 | Status: ACTIVE | COMMUNITY

## 2025-01-02 ENCOUNTER — APPOINTMENT (OUTPATIENT)
Dept: OTOLARYNGOLOGY | Facility: CLINIC | Age: 75
End: 2025-01-02